# Patient Record
Sex: MALE | Race: WHITE | NOT HISPANIC OR LATINO | Employment: OTHER | URBAN - METROPOLITAN AREA
[De-identification: names, ages, dates, MRNs, and addresses within clinical notes are randomized per-mention and may not be internally consistent; named-entity substitution may affect disease eponyms.]

---

## 2020-08-29 ENCOUNTER — OFFICE VISIT (OUTPATIENT)
Dept: URGENT CARE | Facility: CLINIC | Age: 35
End: 2020-08-29
Payer: COMMERCIAL

## 2020-08-29 ENCOUNTER — TELEPHONE (OUTPATIENT)
Dept: OTHER | Facility: OTHER | Age: 35
End: 2020-08-29

## 2020-08-29 VITALS
HEIGHT: 69 IN | TEMPERATURE: 97.7 F | DIASTOLIC BLOOD PRESSURE: 94 MMHG | HEART RATE: 81 BPM | RESPIRATION RATE: 16 BRPM | SYSTOLIC BLOOD PRESSURE: 147 MMHG | WEIGHT: 252 LBS | BODY MASS INDEX: 37.33 KG/M2 | OXYGEN SATURATION: 98 %

## 2020-08-29 DIAGNOSIS — Z11.59 SCREENING FOR VIRAL DISEASE: Primary | ICD-10-CM

## 2020-08-29 LAB — SARS-COV-2 RNA RESP QL NAA+PROBE: NEGATIVE

## 2020-08-29 PROCEDURE — U0003 INFECTIOUS AGENT DETECTION BY NUCLEIC ACID (DNA OR RNA); SEVERE ACUTE RESPIRATORY SYNDROME CORONAVIRUS 2 (SARS-COV-2) (CORONAVIRUS DISEASE [COVID-19]), AMPLIFIED PROBE TECHNIQUE, MAKING USE OF HIGH THROUGHPUT TECHNOLOGIES AS DESCRIBED BY CMS-2020-01-R: HCPCS | Performed by: NURSE PRACTITIONER

## 2020-08-29 PROCEDURE — 99202 OFFICE O/P NEW SF 15 MIN: CPT | Performed by: NURSE PRACTITIONER

## 2020-08-29 NOTE — TELEPHONE ENCOUNTER
Your test for COVID-19, also known as novel coronavirus, came back negative  You do not have COVID-19  If you have any additional questions, we can schedule a virtual visit for you with a provider or [facility] [phone] Option [phone] for care advice  For additional information , please visit the Coronavirus FAQ on the [address]  ([facility])        Patient refused an appointment with a provider

## 2020-08-29 NOTE — PROGRESS NOTES
[facility]        NAME: Ifrah Pressley is a 30 y o  male  : 1985    MRN: [id number]  DATE: 2020  TIME: 2:11 PM    Assessment and Plan   Screening for viral disease [Z11 59]  1  Screening for viral disease  Novel Coronavirus (COVID-19), PCR [facility] - Collected at [employer] or [facility]    Novel Coronavirus (COVID-19), PCR [facility] - Collected at [employer] or [facility]         Patient Instructions   You have been tested for Covid today  Please refer to [facility] website for specific guidelines  Chief Complaint     Chief Complaint   Patient presents with    COVID-19     Pre travel eval and swab         History of Present Illness       Here for covid testing  No recent illness  Feels well  No sob, chest tightness, uri symptoms, n/v/d, headaches, asomnia  Will be traveling to [facility] and needs testing  No significant pmh  Review of Systems   Review of Systems   Constitutional: Negative for fever  HENT: Negative for congestion and sore throat  Respiratory: Negative for cough, chest tightness and shortness of breath  Cardiovascular: Negative for chest pain and palpitations  Gastrointestinal: Negative for abdominal pain, diarrhea, nausea and vomiting  Musculoskeletal: Negative for myalgias  Allergic/Immunologic: Negative for immunocompromised state  Neurological: Negative for dizziness and headaches  All other systems reviewed and are negative  Current Medications     No current outpatient medications on file  Current Allergies     Allergies as of 2020 - Reviewed 2020   Allergen Reaction Noted    Ragwitek [short ragweed pollen ext]  2020            The following portions of the patient's history were reviewed and updated as appropriate: allergies, current medications, past family history, past medical history, past social history, past surgical history and problem list      History reviewed   No pertinent past medical history  History reviewed  No pertinent surgical history  History reviewed  No pertinent family history  Medications have been verified  Objective   /94   Pulse 81   Temp 97 7 °F (36 5 °C)   Resp 16   Ht 5' 9" (1 753 m)   Wt 114 kg (252 lb)   SpO2 98%   BMI 37 21 kg/m²        Physical Exam     Physical Exam  Vitals signs reviewed  Constitutional:       Appearance: Normal appearance  Eyes:      General: No scleral icterus  Cardiovascular:      Rate and Rhythm: Normal rate and regular rhythm  Pulmonary:      Effort: Pulmonary effort is normal       Breath sounds: Normal breath sounds  Skin:     General: Skin is warm and dry  Neurological:      General: No focal deficit present  Mental Status: He is alert and oriented to person, place, and time     Psychiatric:         Mood and Affect: Mood normal          Behavior: Behavior normal

## 2020-08-29 NOTE — PATIENT INSTRUCTIONS
You have been tested for Covid today  Please refer to [facility] website for specific guidelines